# Patient Record
Sex: FEMALE | Race: BLACK OR AFRICAN AMERICAN | NOT HISPANIC OR LATINO | Employment: FULL TIME | URBAN - METROPOLITAN AREA
[De-identification: names, ages, dates, MRNs, and addresses within clinical notes are randomized per-mention and may not be internally consistent; named-entity substitution may affect disease eponyms.]

---

## 2023-06-22 LAB
GRAM STAIN: NORMAL
TISSUE/WOUND CULTURE/SMEAR: NORMAL

## 2023-09-15 PROBLEM — D57.20: Status: ACTIVE | Noted: 2023-09-15

## 2023-09-15 PROBLEM — L08.9 SKIN INFECTION: Status: ACTIVE | Noted: 2023-09-15

## 2023-09-15 PROBLEM — N63.10 BREAST MASS, RIGHT: Status: ACTIVE | Noted: 2023-09-15

## 2023-09-15 PROBLEM — R60.0 EDEMA, LOWER EXTREMITY: Status: ACTIVE | Noted: 2023-09-15

## 2023-09-15 PROBLEM — D64.9 ANEMIA: Status: ACTIVE | Noted: 2023-09-15

## 2023-09-15 PROBLEM — Z87.39 HISTORY OF RIGHT FOOT DROP: Status: ACTIVE | Noted: 2023-09-15

## 2023-09-15 PROBLEM — H52.13 BILATERAL MYOPIA: Status: ACTIVE | Noted: 2023-09-15

## 2023-09-15 PROBLEM — G56.20 ULNAR NERVE COMPRESSION: Status: ACTIVE | Noted: 2023-09-15

## 2023-09-15 PROBLEM — Z79.631 METHOTREXATE, LONG TERM, CURRENT USE: Status: ACTIVE | Noted: 2023-09-15

## 2023-09-15 PROBLEM — N64.89 BREAST DEFORMITY: Status: ACTIVE | Noted: 2023-09-15

## 2023-09-15 PROBLEM — N89.8 VAGINAL DISCHARGE: Status: ACTIVE | Noted: 2023-09-15

## 2023-09-15 PROBLEM — M72.2 PLANTAR FASCIITIS: Status: ACTIVE | Noted: 2023-09-15

## 2023-09-15 PROBLEM — M19.079 ARTHRITIS OF ANKLE: Status: ACTIVE | Noted: 2023-09-15

## 2023-09-15 PROBLEM — M54.31 SCIATICA OF RIGHT SIDE: Status: ACTIVE | Noted: 2023-09-15

## 2023-09-15 PROBLEM — L70.9 ACNE: Status: ACTIVE | Noted: 2023-09-15

## 2023-09-15 PROBLEM — B19.20 UNSPECIFIED VIRAL HEPATITIS C WITHOUT HEPATIC COMA: Status: ACTIVE | Noted: 2023-09-15

## 2023-09-15 PROBLEM — H52.10 MYOPIA WITH PRESBYOPIA: Status: ACTIVE | Noted: 2023-09-15

## 2023-09-15 PROBLEM — L03.90 CELLULITIS: Status: ACTIVE | Noted: 2023-09-15

## 2023-09-15 PROBLEM — T78.40XA ALLERGIC REACTION: Status: ACTIVE | Noted: 2023-09-15

## 2023-09-15 PROBLEM — R76.8 HEPATITIS C ANTIBODY TEST POSITIVE: Status: ACTIVE | Noted: 2023-09-15

## 2023-09-15 PROBLEM — T14.8XXD DELAYED WOUND HEALING: Status: ACTIVE | Noted: 2023-09-15

## 2023-09-15 PROBLEM — M06.9 RHEUMATOID ARTHRITIS (MULTI): Status: ACTIVE | Noted: 2023-09-15

## 2023-09-15 PROBLEM — J45.901 ASTHMA EXACERBATION (HHS-HCC): Status: ACTIVE | Noted: 2023-09-15

## 2023-09-15 PROBLEM — K21.9 CHRONIC GERD: Status: ACTIVE | Noted: 2023-09-15

## 2023-09-15 PROBLEM — M13.0 POLYARTHRITIS: Status: ACTIVE | Noted: 2023-09-15

## 2023-09-15 PROBLEM — N64.4 BREAST PAIN: Status: ACTIVE | Noted: 2023-09-15

## 2023-09-15 PROBLEM — D21.9 FIBROIDS: Status: ACTIVE | Noted: 2023-09-15

## 2023-09-15 PROBLEM — R10.9 ABDOMINAL WALL PAIN IN LEFT FLANK: Status: ACTIVE | Noted: 2023-09-15

## 2023-09-15 PROBLEM — E55.9 VITAMIN D DEFICIENCY: Status: ACTIVE | Noted: 2023-09-15

## 2023-09-15 PROBLEM — E66.01 MORBID OBESITY WITH BMI OF 50.0-59.9, ADULT (MULTI): Status: ACTIVE | Noted: 2023-09-15

## 2023-09-15 PROBLEM — H53.8 BLURRY VISION, BILATERAL: Status: ACTIVE | Noted: 2023-09-15

## 2023-09-15 PROBLEM — H52.4 MYOPIA WITH PRESBYOPIA: Status: ACTIVE | Noted: 2023-09-15

## 2023-09-15 RX ORDER — ALBUTEROL SULFATE 90 UG/1
2 AEROSOL, METERED RESPIRATORY (INHALATION) EVERY 4 HOURS PRN
COMMUNITY
Start: 2015-11-07 | End: 2023-10-17 | Stop reason: WASHOUT

## 2023-09-15 RX ORDER — FOLIC ACID 1 MG/1
1 TABLET ORAL DAILY
COMMUNITY
Start: 2023-02-27 | End: 2023-10-17 | Stop reason: WASHOUT

## 2023-09-15 RX ORDER — EPINEPHRINE 0.15 MG/.15ML
0.15 INJECTION SUBCUTANEOUS AS NEEDED
COMMUNITY
Start: 2023-02-28 | End: 2023-10-17 | Stop reason: WASHOUT

## 2023-09-15 RX ORDER — ONDANSETRON 4 MG/1
1 TABLET, FILM COATED ORAL EVERY 8 HOURS PRN
COMMUNITY
Start: 2023-06-20 | End: 2023-10-17 | Stop reason: WASHOUT

## 2023-09-15 RX ORDER — METHOTREXATE 2.5 MG/1
8 TABLET ORAL
COMMUNITY
Start: 2022-10-31 | End: 2023-10-17 | Stop reason: SDUPTHER

## 2023-09-15 RX ORDER — AMOXICILLIN 500 MG/1
500 CAPSULE ORAL EVERY 8 HOURS
COMMUNITY
Start: 2023-03-23 | End: 2023-10-17 | Stop reason: WASHOUT

## 2023-09-15 RX ORDER — FUROSEMIDE 40 MG/1
40 TABLET ORAL DAILY PRN
COMMUNITY
Start: 2022-08-19 | End: 2023-10-17 | Stop reason: WASHOUT

## 2023-09-15 RX ORDER — PANTOPRAZOLE SODIUM 40 MG/1
1 TABLET, DELAYED RELEASE ORAL DAILY
COMMUNITY
Start: 2022-08-19 | End: 2023-10-17 | Stop reason: WASHOUT

## 2023-09-15 RX ORDER — CYCLOBENZAPRINE HCL 10 MG
10 TABLET ORAL NIGHTLY
COMMUNITY
Start: 2016-10-21 | End: 2023-10-17 | Stop reason: WASHOUT

## 2023-09-15 RX ORDER — UPADACITINIB 15 MG/1
1 TABLET, EXTENDED RELEASE ORAL DAILY
COMMUNITY
Start: 2022-08-28 | End: 2023-10-17 | Stop reason: WASHOUT

## 2023-09-15 RX ORDER — TRIAMCINOLONE ACETONIDE 1 MG/G
1 OINTMENT TOPICAL 2 TIMES DAILY
COMMUNITY
Start: 2023-02-27 | End: 2023-10-17 | Stop reason: WASHOUT

## 2023-09-15 RX ORDER — IBUPROFEN 800 MG/1
800 TABLET ORAL EVERY 8 HOURS PRN
COMMUNITY
End: 2023-10-17 | Stop reason: WASHOUT

## 2023-09-15 RX ORDER — DEXAMETHASONE 4 MG/1
4 TABLET ORAL
COMMUNITY
Start: 2022-08-05 | End: 2023-10-17 | Stop reason: WASHOUT

## 2023-09-15 RX ORDER — METRONIDAZOLE 7.5 MG/G
1 GEL VAGINAL NIGHTLY
COMMUNITY
Start: 2016-02-12 | End: 2023-10-17 | Stop reason: WASHOUT

## 2023-09-15 RX ORDER — SULFAMETHOXAZOLE AND TRIMETHOPRIM 800; 160 MG/1; MG/1
1 TABLET ORAL
COMMUNITY
Start: 2023-06-20 | End: 2023-10-17 | Stop reason: WASHOUT

## 2023-09-15 RX ORDER — CALCIUM CITRATE/VITAMIN D3 200MG-6.25
TABLET ORAL
COMMUNITY
Start: 2020-01-10 | End: 2023-10-17 | Stop reason: WASHOUT

## 2023-10-02 ENCOUNTER — LAB (OUTPATIENT)
Dept: LAB | Facility: LAB | Age: 48
End: 2023-10-02
Payer: COMMERCIAL

## 2023-10-02 DIAGNOSIS — M06.9 RHEUMATOID ARTHRITIS, UNSPECIFIED (MULTI): Primary | ICD-10-CM

## 2023-10-02 DIAGNOSIS — M06.9 RHEUMATOID ARTHRITIS, UNSPECIFIED (MULTI): ICD-10-CM

## 2023-10-02 LAB
BASOPHILS # BLD AUTO: 0.01 X10*3/UL (ref 0–0.1)
BASOPHILS NFR BLD AUTO: 0.1 %
EOSINOPHIL # BLD AUTO: 0.33 X10*3/UL (ref 0–0.7)
EOSINOPHIL NFR BLD AUTO: 4.3 %
ERYTHROCYTE [DISTWIDTH] IN BLOOD BY AUTOMATED COUNT: 23.4 % (ref 11.5–14.5)
HCT VFR BLD AUTO: 29.8 % (ref 36–46)
HGB BLD-MCNC: 9.8 G/DL (ref 12–16)
IMM GRANULOCYTES # BLD AUTO: 0.02 X10*3/UL (ref 0–0.7)
IMM GRANULOCYTES NFR BLD AUTO: 0.3 % (ref 0–0.9)
LYMPHOCYTES # BLD AUTO: 1.32 X10*3/UL (ref 1.2–4.8)
LYMPHOCYTES NFR BLD AUTO: 17.2 %
MCH RBC QN AUTO: 23.8 PG (ref 26–34)
MCHC RBC AUTO-ENTMCNC: 32.9 G/DL (ref 32–36)
MCV RBC AUTO: 72 FL (ref 80–100)
MONOCYTES # BLD AUTO: 0.57 X10*3/UL (ref 0.1–1)
MONOCYTES NFR BLD AUTO: 7.4 %
NEUTROPHILS # BLD AUTO: 5.43 X10*3/UL (ref 1.2–7.7)
NEUTROPHILS NFR BLD AUTO: 70.7 %
NRBC BLD-RTO: 0 /100 WBCS (ref 0–0)
PLATELET # BLD AUTO: 231 X10*3/UL (ref 150–450)
PMV BLD AUTO: 8.6 FL (ref 7.5–11.5)
RBC # BLD AUTO: 4.12 X10*6/UL (ref 4–5.2)
WBC # BLD AUTO: 7.7 X10*3/UL (ref 4.4–11.3)

## 2023-10-02 PROCEDURE — 36415 COLL VENOUS BLD VENIPUNCTURE: CPT

## 2023-10-03 LAB
ALBUMIN SERPL BCP-MCNC: 4 G/DL (ref 3.4–5)
ALP SERPL-CCNC: 86 U/L (ref 33–110)
ALT SERPL W P-5'-P-CCNC: 12 U/L (ref 7–45)
ANION GAP SERPL CALC-SCNC: 13 MMOL/L (ref 10–20)
AST SERPL W P-5'-P-CCNC: 15 U/L (ref 9–39)
BILIRUB SERPL-MCNC: 0.8 MG/DL (ref 0–1.2)
BUN SERPL-MCNC: 10 MG/DL (ref 6–23)
CALCIUM SERPL-MCNC: 8.8 MG/DL (ref 8.6–10.6)
CHLORIDE SERPL-SCNC: 107 MMOL/L (ref 98–107)
CO2 SERPL-SCNC: 26 MMOL/L (ref 21–32)
CREAT SERPL-MCNC: 1 MG/DL (ref 0.5–1.05)
CRP SERPL-MCNC: 2.73 MG/DL
GFR SERPL CREATININE-BSD FRML MDRD: 69 ML/MIN/1.73M*2
GLUCOSE SERPL-MCNC: 63 MG/DL (ref 74–99)
POTASSIUM SERPL-SCNC: 4.5 MMOL/L (ref 3.5–5.3)
PROT SERPL-MCNC: 6.8 G/DL (ref 6.4–8.2)
SODIUM SERPL-SCNC: 141 MMOL/L (ref 136–145)

## 2023-10-17 ENCOUNTER — OFFICE VISIT (OUTPATIENT)
Dept: RHEUMATOLOGY | Facility: CLINIC | Age: 48
End: 2023-10-17
Payer: COMMERCIAL

## 2023-10-17 VITALS
SYSTOLIC BLOOD PRESSURE: 127 MMHG | DIASTOLIC BLOOD PRESSURE: 74 MMHG | BODY MASS INDEX: 48.82 KG/M2 | WEIGHT: 293 LBS | TEMPERATURE: 97.2 F | HEART RATE: 99 BPM | HEIGHT: 65 IN

## 2023-10-17 DIAGNOSIS — M06.9 RHEUMATOID ARTHRITIS INVOLVING MULTIPLE SITES, UNSPECIFIED WHETHER RHEUMATOID FACTOR PRESENT (MULTI): Primary | ICD-10-CM

## 2023-10-17 DIAGNOSIS — J45.901 MILD ASTHMA WITH EXACERBATION, UNSPECIFIED WHETHER PERSISTENT (HHS-HCC): ICD-10-CM

## 2023-10-17 PROCEDURE — 99213 OFFICE O/P EST LOW 20 MIN: CPT | Performed by: INTERNAL MEDICINE

## 2023-10-17 RX ORDER — FUROSEMIDE 40 MG/1
40 TABLET ORAL DAILY PRN
Qty: 90 TABLET | Refills: 3 | Status: SHIPPED | OUTPATIENT
Start: 2023-10-17

## 2023-10-17 RX ORDER — METHYLPREDNISOLONE 4 MG/1
4 TABLET ORAL DAILY
Qty: 7 TABLET | Refills: 0 | Status: SHIPPED | OUTPATIENT
Start: 2023-10-17 | End: 2023-10-24

## 2023-10-17 RX ORDER — ALBUTEROL SULFATE 5 MG/ML
2.5 SOLUTION RESPIRATORY (INHALATION) EVERY 6 HOURS PRN
Qty: 20 ML | Refills: 10 | Status: SHIPPED | OUTPATIENT
Start: 2023-10-17 | End: 2024-04-26 | Stop reason: SDUPTHER

## 2023-10-17 RX ORDER — CLINDAMYCIN PHOSPHATE 11.9 MG/ML
SOLUTION TOPICAL 2 TIMES DAILY
COMMUNITY
Start: 2017-10-05

## 2023-10-17 RX ORDER — DEXTROMETHORPHAN HYDROBROMIDE, GUAIFENESIN 5; 100 MG/5ML; MG/5ML
650 LIQUID ORAL EVERY 8 HOURS PRN
COMMUNITY

## 2023-10-17 RX ORDER — CYCLOBENZAPRINE HCL 10 MG
10 TABLET ORAL NIGHTLY
Qty: 180 TABLET | Refills: 1 | Status: SHIPPED | OUTPATIENT
Start: 2023-10-17 | End: 2024-04-26 | Stop reason: SDUPTHER

## 2023-10-17 RX ORDER — ALBUTEROL SULFATE 5 MG/ML
2.5 SOLUTION RESPIRATORY (INHALATION) EVERY 6 HOURS PRN
COMMUNITY
End: 2023-10-17 | Stop reason: SDUPTHER

## 2023-10-17 RX ORDER — METHOTREXATE 2.5 MG/1
20 TABLET ORAL
Qty: 72 TABLET | Refills: 2 | Status: SHIPPED | OUTPATIENT
Start: 2023-10-17 | End: 2024-10-16

## 2023-10-17 ASSESSMENT — PAIN SCALES - GENERAL: PAINLEVEL: 4

## 2023-10-17 NOTE — PROGRESS NOTES
Chief Complaint   Patient presents with    Rheumatoid Arthritis   She complains of pain involving multiple joints for the past 2 weeks.  The pain particular involves the ulnar aspect of the left wrist and left hand, lower back, knees, feet.  She also notes pain in the lower back to be different than her usual sciatica pain.  She has some numbness sensation in the fourth and fifth digits of the right foot.  She notes stiffness in the mornings in particular after prolonged sitting.  She sits for several hours at work.  She was unable to tolerate Rinvoq due to nausea vomiting and diarrhea.    She is alert, overweight, the lungs are clear to auscultation.  The heart is regular in rhythm.  The abdomen is protuberant but benign.  Extremities without edema.  The musculoskeletal examination shows tenderness with slight soft tissue thickening in the ulnar aspect of the left wrist.  There is mild tenderness on the extensor aspect of the elbows and on the shoulders.  There is tenderness of the right knee.  There is tenderness at the MTP joint of the digits of both feet, left more than right.  The straight leg raise test is normal bilaterally in the seated position.  She has lower back pain when lying in the supine position.      Lab Results   Component Value Date    WBC 7.7 10/02/2023    HGB 9.8 (L) 10/02/2023    HCT 29.8 (L) 10/02/2023    MCV 72 (L) 10/02/2023     10/02/2023     Lab Results   Component Value Date    GLUCOSE 63 (L) 10/02/2023    CALCIUM 8.8 10/02/2023     10/02/2023    K 4.5 10/02/2023    CO2 26 10/02/2023     10/02/2023    BUN 10 10/02/2023    CREATININE 1.00 10/02/2023     Lab Results   Component Value Date    SEDRATE 21 (H) 01/14/2020     Lab Results   Component Value Date    CRP 2.73 (H) 10/02/2023          She has rheumatoid arthritis, eczema, and asthma, positive hepatitis C antibody with negative hepatitis C PCR.  She has had an exacerbation of rheumatoid arthritis involving multiple  joints.  She has symptoms suggestive of right lumbar radiculopathy.  She has a microcytic anemia    She is to do stretching exercises of the lower back.  She is to take methylprednisolone 4 mg daily for 5 to 7 days.  She is to have laboratory for follow-up of methotrexate therapy.   She is to return at the next available office appointment.

## 2023-11-08 ENCOUNTER — APPOINTMENT (OUTPATIENT)
Dept: SURGICAL ONCOLOGY | Facility: CLINIC | Age: 48
End: 2023-11-08
Payer: COMMERCIAL

## 2023-12-15 NOTE — PROGRESS NOTES
BREAST SURGERY NEW PATIENT VISIT    Subjective   Maryann Stockton is a 48 y.o. female referred by Katie Drake CNP for current breast infections following a breast reduction.  In 2016 she had a breast reduction by Dr. Won Terrell.  Immediately following her surgery she states that her wound opened up in the lower part of the right breast.  She saw him back the day following surgery and the area was closed by secondary intention.  She states that postoperatively she had a lot of firmness and hardness in both breasts.  She massaged them daily.  On the left side most of the hardness resolved however, on the right side she was still left with a lot of hardness in the lower outer part of the breast.  In 2022 she began having intermittent infections in her right breast near the areola. 9/15/2022 right ultrasound core biopsy noted fat necrosis.  She feels like there is a now a hard lump near her areola.  There is a pinpoint opening in the 6 o'clock position just outside of the areola which has intermittent drainage.  Overall the lump is getting smaller and she has not had any drainage recently.  But this has been very taking a very long time to improve.  She has intermittently been on Bactrim and it improved her symptoms.  Her last antibiotics were in October 2023.    She is interested in having a revision of her reduction because she still has a lot of redundant tissue in the lateral right breast.  She is also asking to be on antibiotics again in order to prevent another infection.    She is here today with her mother.    Mammogram: July 2023 showed several abscesses in the right breast.  No evidence of malignancy.  Category 2.    Radiology review: All images and reports were personally reviewed.       Past Medical History: Rheumatoid arthritis, sciatica, acid reflux    Non-smoker    GYN History:  Menarche: 10    Menopause: n/a   HRT: n/a  Birth control use: no  Fertility treatment: no  Hysterectomy: no    G 3 P  0  Previous biopsies: Yes -September 2022-fat necrosis, breast  Previous breast surgeries: Yes -breast reduction 2016  Prior breast cancer: No    Family history: none      Review of Systems   Constitutional symptoms: Denies generalized fatigue.  Denies weight change, fevers/chills, difficulty sleeping   Eyes: Denies double vision, glaucoma, cataracts.  Ear/nose/throat/mouth: Denies hearing changes, sore throat, sinus problems.  Cardiovascular: No chest pain.  Denies irregular heartbeat.  Denies ankle swelling.  Respiratory: No wheezing, cough, or shortness of breath.  Gastrointestinal: No abdominal pain,  No nausea/vomiting.  No indigestion/heartburn.  No change in bowel habits.  No constipation or diarrhea.   Genitourinary: No urinary incontinence.  No urinary frequency.  No painful urination.  Musculoskeletal: No bone pain, no muscle pain, no joint pain.   Integumentary: No rash. No masses.  No changes in moles.  No easy bruising.  Neurological: No headaches.  No tremors. No numbness/tingling.  Psychiatric: No anxiety. No depression.  Endocrine: No excessive thirst.  Not too hot or too cold.  Not tired or fatigued.    Hematological/lymphatic: No swollen glands or blood clotting problems.  No bruising.    PHYSICAL EXAM:    General: Alert and oriented x 3.  Mood and affect are appropriate.  HEENT: EOMI, PERRLA.   Neck: supple, no masses, no cervical adenopathy.  Cardiovascular: no lower extremity edema.  Regular rhythm.  Pulmonary: breathing non labored on room air.  Clear to auscultation.  GI: Abdomen soft, no masses. No hepatomegaly or splenomegaly.  Lymph nodes: No supraclavicular or axillary adenopathy bilaterally.  Musculoskeletal: Full range of motion in the upper extremities bilaterally.  Neuro: denies dizziness, tremors  Physical Exam  Chest:          Comments: No cervical, supraclavicular or axillary lymphadenopathy.  Bilateral well-healed Wise pattern incisions.  On the left side there are no palpable  masses, no skin changes and no nipple discharge.  On the right side there is no nipple discharge.  There is a pinpoint scabbed opening in the 6 o'clock position at the edge of the areola without any drainage.  There is firmness in the entire lateral and inferior aspect of the right breast.  There is some firmness in the 3 to 4 o'clock position at the edge of the areola.        Assessment/Plan     48-year-old female who had a breast reduction in 2016.    Following the breast reduction she had wound dehiscence on the right side and has had continued problems with scar tissue and induration.  Currently, there is a lot of induration in the lateral aspect of the right breast and she has more redundant tissue on the right side than the left.  She is interested in a repeat reduction for symmetry.  I have referred her to plastic surgery for that.  She also has had intermittent infections in the periareolar area of the right breast.  She has some firmness just under the areola in the medial inferior aspect.  There is a scabbed pinpoint opening in the 6 o'clock position.  Currently no evidence of infection or drainage.  She does not need to be on antibiotics at this time as there is no acute infection.  I would not recommend surgery at this time since the infection appears to be under control and the drainage and pain are improving.      Megan Wolf MD

## 2023-12-22 ENCOUNTER — OFFICE VISIT (OUTPATIENT)
Dept: SURGICAL ONCOLOGY | Facility: CLINIC | Age: 48
End: 2023-12-22
Payer: COMMERCIAL

## 2023-12-22 VITALS
HEART RATE: 112 BPM | WEIGHT: 293 LBS | TEMPERATURE: 98.2 F | DIASTOLIC BLOOD PRESSURE: 88 MMHG | BODY MASS INDEX: 49.26 KG/M2 | SYSTOLIC BLOOD PRESSURE: 153 MMHG

## 2023-12-22 DIAGNOSIS — N64.4 BREAST PAIN: ICD-10-CM

## 2023-12-22 PROCEDURE — 99203 OFFICE O/P NEW LOW 30 MIN: CPT | Performed by: SURGERY

## 2023-12-22 PROCEDURE — 99213 OFFICE O/P EST LOW 20 MIN: CPT | Performed by: SURGERY

## 2023-12-22 RX ORDER — SULFAMETHOXAZOLE AND TRIMETHOPRIM 800; 160 MG/1; MG/1
TABLET ORAL EVERY 12 HOURS
COMMUNITY
Start: 2023-06-20

## 2023-12-22 RX ORDER — CALCIUM CITRATE/VITAMIN D3 200MG-6.25
TABLET ORAL
COMMUNITY
Start: 2020-01-10

## 2023-12-22 RX ORDER — ONDANSETRON 4 MG/1
TABLET, FILM COATED ORAL EVERY 8 HOURS
COMMUNITY
Start: 2023-06-20

## 2023-12-22 RX ORDER — EPINEPHRINE 0.15 MG/.15ML
INJECTION SUBCUTANEOUS
COMMUNITY
Start: 2023-02-27

## 2023-12-22 RX ORDER — DEXAMETHASONE 4 MG/1
TABLET ORAL
COMMUNITY
Start: 2022-08-05

## 2023-12-22 RX ORDER — IBUPROFEN 800 MG/1
TABLET ORAL
COMMUNITY

## 2023-12-22 RX ORDER — DICLOFENAC SODIUM 16.05 MG/ML
SOLUTION TOPICAL
COMMUNITY
Start: 2020-07-10

## 2023-12-22 RX ORDER — PANTOPRAZOLE SODIUM 40 MG/1
TABLET, DELAYED RELEASE ORAL
COMMUNITY
Start: 2022-08-19

## 2023-12-22 RX ORDER — AMOXICILLIN 500 MG/1
CAPSULE ORAL EVERY 8 HOURS
COMMUNITY
Start: 2023-03-23

## 2023-12-22 RX ORDER — FOLIC ACID 1 MG/1
TABLET ORAL
COMMUNITY
Start: 2023-02-27

## 2023-12-22 RX ORDER — UPADACITINIB 15 MG/1
TABLET, EXTENDED RELEASE ORAL
COMMUNITY
Start: 2020-01-10 | End: 2024-04-26 | Stop reason: WASHOUT

## 2023-12-22 RX ORDER — CETIRIZINE HYDROCHLORIDE 10 MG/1
TABLET ORAL
COMMUNITY
Start: 2022-10-21

## 2023-12-22 ASSESSMENT — PAIN SCALES - GENERAL: PAINLEVEL: 0-NO PAIN

## 2024-04-11 ENCOUNTER — LAB (OUTPATIENT)
Dept: LAB | Facility: LAB | Age: 49
End: 2024-04-11
Payer: COMMERCIAL

## 2024-04-11 ENCOUNTER — TELEPHONE (OUTPATIENT)
Dept: RHEUMATOLOGY | Facility: CLINIC | Age: 49
End: 2024-04-11

## 2024-04-11 DIAGNOSIS — M06.9 RHEUMATOID ARTHRITIS INVOLVING MULTIPLE SITES, UNSPECIFIED WHETHER RHEUMATOID FACTOR PRESENT (MULTI): ICD-10-CM

## 2024-04-11 LAB
BASOPHILS # BLD MANUAL: 0.13 X10*3/UL (ref 0–0.1)
BASOPHILS NFR BLD MANUAL: 1.8 %
CRP SERPL-MCNC: 2.27 MG/DL
DACRYOCYTES BLD QL SMEAR: ABNORMAL
EOSINOPHIL # BLD MANUAL: 0.19 X10*3/UL (ref 0–0.7)
EOSINOPHIL NFR BLD MANUAL: 2.6 %
ERYTHROCYTE [DISTWIDTH] IN BLOOD BY AUTOMATED COUNT: 23 % (ref 11.5–14.5)
HCT VFR BLD AUTO: 29.6 % (ref 36–46)
HGB BLD-MCNC: 9.7 G/DL (ref 12–16)
HYPOCHROMIA BLD QL SMEAR: ABNORMAL
IMM GRANULOCYTES # BLD AUTO: 0.02 X10*3/UL (ref 0–0.7)
IMM GRANULOCYTES NFR BLD AUTO: 0.3 % (ref 0–0.9)
LYMPHOCYTES # BLD MANUAL: 1.09 X10*3/UL (ref 1.2–4.8)
LYMPHOCYTES NFR BLD MANUAL: 14.9 %
MCH RBC QN AUTO: 23.3 PG (ref 26–34)
MCHC RBC AUTO-ENTMCNC: 32.8 G/DL (ref 32–36)
MCV RBC AUTO: 71 FL (ref 80–100)
MONOCYTES # BLD MANUAL: 0.26 X10*3/UL (ref 0.1–1)
MONOCYTES NFR BLD MANUAL: 3.5 %
NEUTS SEG # BLD MANUAL: 5.5 X10*3/UL (ref 1.2–7)
NEUTS SEG NFR BLD MANUAL: 75.4 %
NRBC BLD-RTO: 0.3 /100 WBCS (ref 0–0)
OVALOCYTES BLD QL SMEAR: ABNORMAL
PLATELET # BLD AUTO: 272 X10*3/UL (ref 150–450)
RBC # BLD AUTO: 4.17 X10*6/UL (ref 4–5.2)
RBC MORPH BLD: ABNORMAL
SCHISTOCYTES BLD QL SMEAR: ABNORMAL
STOMATOCYTES BLD QL SMEAR: ABNORMAL
TOTAL CELLS COUNTED BLD: 114
VARIANT LYMPHS # BLD MANUAL: 0.13 X10*3/UL (ref 0–0.5)
VARIANT LYMPHS NFR BLD: 1.8 %
WBC # BLD AUTO: 7.3 X10*3/UL (ref 4.4–11.3)

## 2024-04-11 PROCEDURE — 36415 COLL VENOUS BLD VENIPUNCTURE: CPT

## 2024-04-11 PROCEDURE — 86140 C-REACTIVE PROTEIN: CPT

## 2024-04-11 PROCEDURE — 85027 COMPLETE CBC AUTOMATED: CPT

## 2024-04-11 PROCEDURE — 85007 BL SMEAR W/DIFF WBC COUNT: CPT

## 2024-04-26 ENCOUNTER — OFFICE VISIT (OUTPATIENT)
Dept: RHEUMATOLOGY | Facility: CLINIC | Age: 49
End: 2024-04-26
Payer: COMMERCIAL

## 2024-04-26 VITALS
BODY MASS INDEX: 48.82 KG/M2 | SYSTOLIC BLOOD PRESSURE: 112 MMHG | WEIGHT: 293 LBS | TEMPERATURE: 97.5 F | HEART RATE: 108 BPM | DIASTOLIC BLOOD PRESSURE: 67 MMHG | HEIGHT: 65 IN

## 2024-04-26 DIAGNOSIS — M06.9 RHEUMATOID ARTHRITIS INVOLVING MULTIPLE SITES, UNSPECIFIED WHETHER RHEUMATOID FACTOR PRESENT (MULTI): ICD-10-CM

## 2024-04-26 DIAGNOSIS — M06.9 RHEUMATOID ARTHRITIS INVOLVING MULTIPLE SITES, UNSPECIFIED WHETHER RHEUMATOID FACTOR PRESENT (MULTI): Primary | ICD-10-CM

## 2024-04-26 PROCEDURE — 99214 OFFICE O/P EST MOD 30 MIN: CPT | Performed by: INTERNAL MEDICINE

## 2024-04-26 RX ORDER — ALBUTEROL SULFATE 5 MG/ML
2.5 SOLUTION RESPIRATORY (INHALATION) EVERY 6 HOURS
Qty: 20 ML | Refills: 10 | Status: SHIPPED | OUTPATIENT
Start: 2024-04-26 | End: 2024-05-01

## 2024-04-26 RX ORDER — METHYLPREDNISOLONE 4 MG/1
TABLET ORAL
Qty: 21 TABLET | Refills: 1 | Status: SHIPPED | OUTPATIENT
Start: 2024-04-26 | End: 2024-05-03

## 2024-04-26 RX ORDER — MELOXICAM 7.5 MG/1
15 TABLET ORAL DAILY
Qty: 30 TABLET | Refills: 2 | Status: SHIPPED | OUTPATIENT
Start: 2024-04-26 | End: 2025-04-26

## 2024-04-26 RX ORDER — CYCLOBENZAPRINE HCL 10 MG
10 TABLET ORAL NIGHTLY
Qty: 180 TABLET | Refills: 3 | Status: SHIPPED | OUTPATIENT
Start: 2024-04-26

## 2024-04-26 RX ORDER — ALBUTEROL SULFATE 90 UG/1
2 AEROSOL, METERED RESPIRATORY (INHALATION) EVERY 6 HOURS PRN
Qty: 18 G | Refills: 11 | Status: SHIPPED | OUTPATIENT
Start: 2024-04-26 | End: 2025-04-26

## 2024-04-26 ASSESSMENT — PAIN SCALES - GENERAL: PAINLEVEL: 0-NO PAIN

## 2024-04-26 NOTE — PROGRESS NOTES
She complains of diffuse musculoskeletal pain particular involving her hands and shoulders as well as the posterior base of her neck.  She has pain and discoloration of her feet.  The increased pain and been present for the past 3 weeks.  She has been taking ibuprofen 800 mg twice per day for the past few days.  Tylenol did not relieve the musculoskeletal pain. She had increased the methotrexate from 20 mg to 25 mg for 1 day without any improvement in the musculoskeletal pain. She also notes generalized stiffness and has swelling in her hands.  She notes difficulty lifting the left arm because of the pain in the left shoulder.  She had respiratory tract infection recently with coughing and hoarseness.      The sclera are not injected.  The lungs are clear to auscultation.  The heart has a regular rate and rhythm.  The abdomen is protuberant but benign.  The extremities are without edema.  The musculoskeletal examination shows tenderness in the MCP joint of the digits of both hands, right more than left.  There is tenderness in the right wrist.  There is pain on passive range of motion of the shoulders, left more than right.  There is tenderness and slight crepitus on range of motion of the knees.  There is slight soft tissue thickening of the left ankle.    Laboratory (4/11/2024) WBC 7.3, hemoglobin 9.7, hematocrit 29.6, MCV 71, MCHC 32.8, platelets 272, CRP 2.27.    She has rheumatoid arthritis with polyarticular joint pain.  She has history of eczema, asthma, positive hepatitis C antibody with a negative hepatitis C RNA PCR.  She has history of lower back pain and history of slightly elevated serum creatinine.    She is to take methylprednisolone 4 mg Dosepak.  She may take meloxicam 7.5 mg once per day as needed for pain.  She is to continue methotrexate 20 mg once per week and cyclobenzaprine 10 mg at bedtime as needed for muscle spasms.

## 2024-05-01 ENCOUNTER — TELEPHONE (OUTPATIENT)
Dept: RHEUMATOLOGY | Facility: CLINIC | Age: 49
End: 2024-05-01

## 2024-05-01 DIAGNOSIS — J45.901 EXACERBATION OF ASTHMA, UNSPECIFIED ASTHMA SEVERITY, UNSPECIFIED WHETHER PERSISTENT (HHS-HCC): ICD-10-CM

## 2024-05-01 DIAGNOSIS — M06.9 RHEUMATOID ARTHRITIS INVOLVING MULTIPLE SITES, UNSPECIFIED WHETHER RHEUMATOID FACTOR PRESENT (MULTI): ICD-10-CM

## 2024-05-01 RX ORDER — IPRATROPIUM BROMIDE AND ALBUTEROL SULFATE 2.5; .5 MG/3ML; MG/3ML
3 SOLUTION RESPIRATORY (INHALATION)
Qty: 180 ML | Refills: 11 | Status: SHIPPED | OUTPATIENT
Start: 2024-05-01 | End: 2025-05-01

## 2024-05-01 NOTE — TELEPHONE ENCOUNTER
[facility] at [phone] called and indicated that the Albuterol script needs changed to Albuterol neb vials.

## 2024-07-03 DIAGNOSIS — M06.9 RHEUMATOID ARTHRITIS INVOLVING MULTIPLE SITES, UNSPECIFIED WHETHER RHEUMATOID FACTOR PRESENT (MULTI): ICD-10-CM

## 2024-07-03 RX ORDER — MELOXICAM 7.5 MG/1
15 TABLET ORAL DAILY
Qty: 60 TABLET | Refills: 1 | Status: SHIPPED | OUTPATIENT
Start: 2024-07-03 | End: 2025-07-03

## 2024-08-31 DIAGNOSIS — J45.901 MILD ASTHMA WITH EXACERBATION, UNSPECIFIED WHETHER PERSISTENT (HHS-HCC): ICD-10-CM

## 2024-09-12 ENCOUNTER — TELEPHONE (OUTPATIENT)
Dept: RHEUMATOLOGY | Facility: CLINIC | Age: 49
End: 2024-09-12
Payer: COMMERCIAL

## 2024-09-12 NOTE — TELEPHONE ENCOUNTER
Patient would like a 3 month supply on her methotrexate 2.5mg refill.    [facility]/pharmacy #[id number] - [place] [id number] AT [facility] [address] [place] [address] [address], [facility] [address]  Phone: [phone]  Fax: [phone]  SARITA #: [id number]

## 2024-09-13 DIAGNOSIS — J45.901 MILD ASTHMA WITH EXACERBATION, UNSPECIFIED WHETHER PERSISTENT (HHS-HCC): ICD-10-CM

## 2024-09-13 RX ORDER — METHOTREXATE 2.5 MG/1
20 TABLET ORAL
Qty: 72 TABLET | Refills: 2 | Status: SHIPPED | OUTPATIENT
Start: 2024-09-15 | End: 2025-09-15

## 2024-09-13 RX ORDER — METHOTREXATE 2.5 MG/1
TABLET ORAL
Qty: 72 TABLET | Refills: 2 | OUTPATIENT
Start: 2024-09-13

## 2024-09-13 NOTE — TELEPHONE ENCOUNTER
Call placed to patient to make aware of needed labs and to remind of follow up appointment. Pt states she is completely out and if she can get enough to get to scheduled appointment that would be great. This nurse to discuss with provider. Pt encouraged to keep upcoming appointment and call should anything change.

## 2024-09-16 ENCOUNTER — LAB (OUTPATIENT)
Dept: LAB | Facility: LAB | Age: 49
End: 2024-09-16
Payer: COMMERCIAL

## 2024-09-16 DIAGNOSIS — M06.9 RHEUMATOID ARTHRITIS INVOLVING MULTIPLE SITES, UNSPECIFIED WHETHER RHEUMATOID FACTOR PRESENT (MULTI): ICD-10-CM

## 2024-09-16 LAB
ALBUMIN SERPL BCP-MCNC: 4.1 G/DL (ref 3.4–5)
ALP SERPL-CCNC: 78 U/L (ref 33–110)
ALT SERPL W P-5'-P-CCNC: 9 U/L (ref 7–45)
ANION GAP SERPL CALC-SCNC: 12 MMOL/L (ref 10–20)
AST SERPL W P-5'-P-CCNC: 17 U/L (ref 9–39)
BILIRUB SERPL-MCNC: 1.3 MG/DL (ref 0–1.2)
BUN SERPL-MCNC: 9 MG/DL (ref 6–23)
CALCIUM SERPL-MCNC: 8.6 MG/DL (ref 8.6–10.6)
CHLORIDE SERPL-SCNC: 105 MMOL/L (ref 98–107)
CO2 SERPL-SCNC: 26 MMOL/L (ref 21–32)
CREAT SERPL-MCNC: 1.09 MG/DL (ref 0.5–1.05)
CRP SERPL-MCNC: 2.65 MG/DL
EGFRCR SERPLBLD CKD-EPI 2021: 62 ML/MIN/1.73M*2
GLUCOSE SERPL-MCNC: 80 MG/DL (ref 74–99)
POTASSIUM SERPL-SCNC: 4.2 MMOL/L (ref 3.5–5.3)
PROT SERPL-MCNC: 6.8 G/DL (ref 6.4–8.2)
SODIUM SERPL-SCNC: 139 MMOL/L (ref 136–145)

## 2024-09-16 PROCEDURE — 80053 COMPREHEN METABOLIC PANEL: CPT

## 2024-09-16 PROCEDURE — 85027 COMPLETE CBC AUTOMATED: CPT

## 2024-09-16 PROCEDURE — 36415 COLL VENOUS BLD VENIPUNCTURE: CPT

## 2024-09-16 PROCEDURE — 85007 BL SMEAR W/DIFF WBC COUNT: CPT

## 2024-09-16 PROCEDURE — 86140 C-REACTIVE PROTEIN: CPT

## 2024-09-17 LAB
BASOPHILS # BLD MANUAL: 0 X10*3/UL (ref 0–0.1)
BASOPHILS NFR BLD MANUAL: 0 %
EOSINOPHIL # BLD MANUAL: 0.2 X10*3/UL (ref 0–0.7)
EOSINOPHIL NFR BLD MANUAL: 2.6 %
ERYTHROCYTE [DISTWIDTH] IN BLOOD BY AUTOMATED COUNT: 22.4 % (ref 11.5–14.5)
HCT VFR BLD AUTO: 31.7 % (ref 36–46)
HGB BLD-MCNC: 10.7 G/DL (ref 12–16)
HYPOCHROMIA BLD QL SMEAR: ABNORMAL
IMM GRANULOCYTES # BLD AUTO: 0.04 X10*3/UL (ref 0–0.7)
IMM GRANULOCYTES NFR BLD AUTO: 0.5 % (ref 0–0.9)
LYMPHOCYTES # BLD MANUAL: 1.38 X10*3/UL (ref 1.2–4.8)
LYMPHOCYTES NFR BLD MANUAL: 18.2 %
MCH RBC QN AUTO: 23.6 PG (ref 26–34)
MCHC RBC AUTO-ENTMCNC: 33.8 G/DL (ref 32–36)
MCV RBC AUTO: 70 FL (ref 80–100)
MONOCYTES # BLD MANUAL: 0.27 X10*3/UL (ref 0.1–1)
MONOCYTES NFR BLD MANUAL: 3.5 %
NEUTS SEG # BLD MANUAL: 5.02 X10*3/UL (ref 1.2–7)
NEUTS SEG NFR BLD MANUAL: 66.1 %
NRBC BLD-RTO: 0.3 /100 WBCS (ref 0–0)
PLATELET # BLD AUTO: 270 X10*3/UL (ref 150–450)
RBC # BLD AUTO: 4.54 X10*6/UL (ref 4–5.2)
RBC MORPH BLD: ABNORMAL
SCHISTOCYTES BLD QL SMEAR: ABNORMAL
TOTAL CELLS COUNTED BLD: 115
VARIANT LYMPHS # BLD MANUAL: 0.73 X10*3/UL (ref 0–0.5)
VARIANT LYMPHS NFR BLD: 9.6 %
WBC # BLD AUTO: 7.6 X10*3/UL (ref 4.4–11.3)

## 2024-09-23 ENCOUNTER — OFFICE VISIT (OUTPATIENT)
Dept: RHEUMATOLOGY | Facility: CLINIC | Age: 49
End: 2024-09-23
Payer: COMMERCIAL

## 2024-09-23 VITALS
OXYGEN SATURATION: 96 % | TEMPERATURE: 97.3 F | HEART RATE: 90 BPM | WEIGHT: 293 LBS | SYSTOLIC BLOOD PRESSURE: 101 MMHG | BODY MASS INDEX: 49.12 KG/M2 | DIASTOLIC BLOOD PRESSURE: 68 MMHG

## 2024-09-23 DIAGNOSIS — L70.3 TROPICAL ACNE: Primary | ICD-10-CM

## 2024-09-23 DIAGNOSIS — M06.9 RHEUMATOID ARTHRITIS INVOLVING MULTIPLE SITES, UNSPECIFIED WHETHER RHEUMATOID FACTOR PRESENT (MULTI): ICD-10-CM

## 2024-09-23 PROCEDURE — 99214 OFFICE O/P EST MOD 30 MIN: CPT | Performed by: INTERNAL MEDICINE

## 2024-09-23 PROCEDURE — 1036F TOBACCO NON-USER: CPT | Performed by: INTERNAL MEDICINE

## 2024-09-23 RX ORDER — CLINDAMYCIN PHOSPHATE 11.9 MG/ML
SOLUTION TOPICAL 2 TIMES DAILY
Qty: 60 ML | Refills: 3 | Status: SHIPPED | OUTPATIENT
Start: 2024-09-23 | End: 2025-09-23

## 2024-09-23 RX ORDER — MELOXICAM 7.5 MG/1
15 TABLET ORAL DAILY
Qty: 60 TABLET | Refills: 1 | Status: SHIPPED | OUTPATIENT
Start: 2024-09-23 | End: 2025-09-23

## 2024-09-23 NOTE — PROGRESS NOTES
She presents for follow-up evaluation with complaints of pain in the posterior base of the neck and in her lower back.  She has not had recurrence of sciatica symptoms.  She notes pain on the ulnar aspect of the right wrist.S he has not had any significant joint swelling or pain in her other joints.  She does note pain in the left upper anterior chest and in the lower rib cage anteriorly bilaterally.  She takes meloxicam 7.5 mg once per day as needed for pain.  She notes that acetaminophen 650 mg tablet 1-1/2 tablets once or twice per day does not seem to control the musculoskeletal pain well.  She requests clindamycin topical for management of eczema/facial rash.    The lungs are clear to auscultation.  The heart has a regular and rhythm with normal heart sounds.  The abdomen is benign.  The extremities are without edema.  The musculoskeletal examination shows normal range of motion of the upper and lower extremity joints.  There is slight soft tissue thickening in the ulnar aspect of the right wrist otherwise no joint effusions.  The neck is supple.  There is tenderness over the anterior aspect of the left third rib and tenderness overlying the costochondral junctions of the lower rib cage bilaterally.    Laboratory (9/16/2024) WBC 7.6, hemoglobin 10.7, hematocrit 31.7, MCV 70, MCHC 33.8, platelets 270, BUN 9, creatinine 1.09, glucose 80, calcium 8.6, albumin 4.1, alkaline phosphatase 78, AST 17, ALT 9, CRP 2.65.    She has rheumatoid arthritis appears to be fairly stable with mild synovitis involving the ulnar aspect of the right wrist.  She has symptoms suggestive of costochondritis.  She has lower back pain with history of sciatica likely related to lumbar spondylosis.  She has slightly elevated serum creatinine.  She has history of eczema and asthma and positive hepatitis C antibody with negative hepatitis C PCR RNA.  A slight elevation in serum creatinine may be related to meloxicam and body habitus.    She  is to do stretching exercises to the neck and lower back.  She is to continue meloxicam 7.5 mg once per day as needed for pain, methotrexate 20 mg orally weekly.  She is to eat green leafy vegetables/foods high in folic acid since she has intolerance to folic acid supplements.  She is to have laboratory for follow-up of methotrexate and meloxicam.  She is to return at the next available office appointment.

## 2024-11-12 DIAGNOSIS — J45.901 MILD ASTHMA WITH EXACERBATION, UNSPECIFIED WHETHER PERSISTENT (HHS-HCC): ICD-10-CM

## 2024-11-12 RX ORDER — METHOTREXATE 2.5 MG/1
20 TABLET ORAL
Qty: 72 TABLET | Refills: 2 | Status: SHIPPED | OUTPATIENT
Start: 2024-11-17 | End: 2025-11-17

## 2024-11-13 ENCOUNTER — TELEPHONE (OUTPATIENT)
Dept: RHEUMATOLOGY | Facility: CLINIC | Age: 49
End: 2024-11-13
Payer: COMMERCIAL

## 2024-11-13 DIAGNOSIS — M06.9 RHEUMATOID ARTHRITIS INVOLVING MULTIPLE SITES, UNSPECIFIED WHETHER RHEUMATOID FACTOR PRESENT (MULTI): ICD-10-CM

## 2024-11-13 RX ORDER — IBUPROFEN 800 MG/1
800 TABLET ORAL 3 TIMES DAILY PRN
Qty: 60 TABLET | Refills: 0 | Status: SHIPPED | OUTPATIENT
Start: 2024-11-13 | End: 2025-01-12

## 2024-11-13 NOTE — TELEPHONE ENCOUNTER
Placed call to patient, spoke with patient. Patient stated that she has taken ibuprofen yesterday and today as it is the only medication helping to relieve her pain. Patient notified that her prescription request has been sent to Dr. Machado and if approved, it will be sent to her pharmacy. Patient verbalized understanding.

## 2024-11-13 NOTE — TELEPHONE ENCOUNTER
Patient would like a refill on moltrin 800mg. She said she left message on the refill line. The methotrexate is not working for her. Please advise.    [facility]/pharmacy #[id number] - [place], [place] - [address] [address] AT [facility] Phone: [phone]   Fax: [phone]

## 2024-11-21 ENCOUNTER — APPOINTMENT (OUTPATIENT)
Dept: OBSTETRICS AND GYNECOLOGY | Facility: CLINIC | Age: 49
End: 2024-11-21
Payer: COMMERCIAL

## 2024-11-21 ENCOUNTER — LAB (OUTPATIENT)
Dept: LAB | Facility: LAB | Age: 49
End: 2024-11-21
Payer: COMMERCIAL

## 2024-11-21 VITALS
SYSTOLIC BLOOD PRESSURE: 149 MMHG | DIASTOLIC BLOOD PRESSURE: 89 MMHG | HEIGHT: 66 IN | WEIGHT: 292 LBS | BODY MASS INDEX: 46.93 KG/M2

## 2024-11-21 DIAGNOSIS — N81.11 MIDLINE CYSTOCELE: ICD-10-CM

## 2024-11-21 DIAGNOSIS — Z12.31 ENCOUNTER FOR SCREENING MAMMOGRAM FOR BREAST CANCER: ICD-10-CM

## 2024-11-21 DIAGNOSIS — Z11.3 SCREENING FOR STD (SEXUALLY TRANSMITTED DISEASE): Primary | ICD-10-CM

## 2024-11-21 DIAGNOSIS — Z01.419 WELL WOMAN EXAM WITH ROUTINE GYNECOLOGICAL EXAM: ICD-10-CM

## 2024-11-21 DIAGNOSIS — Z12.31 ENCOUNTER FOR SCREENING MAMMOGRAM FOR MALIGNANT NEOPLASM OF BREAST: ICD-10-CM

## 2024-11-21 DIAGNOSIS — M06.9 RHEUMATOID ARTHRITIS INVOLVING MULTIPLE SITES, UNSPECIFIED WHETHER RHEUMATOID FACTOR PRESENT (MULTI): ICD-10-CM

## 2024-11-21 DIAGNOSIS — Z11.3 SCREENING FOR STD (SEXUALLY TRANSMITTED DISEASE): ICD-10-CM

## 2024-11-21 LAB
BASOPHILS # BLD MANUAL: 0 X10*3/UL (ref 0–0.1)
BASOPHILS NFR BLD MANUAL: 0 %
EOSINOPHIL # BLD MANUAL: 0.25 X10*3/UL (ref 0–0.7)
EOSINOPHIL NFR BLD MANUAL: 2.6 %
ERYTHROCYTE [DISTWIDTH] IN BLOOD BY AUTOMATED COUNT: 21.7 % (ref 11.5–14.5)
HBV SURFACE AG SERPL QL IA: NONREACTIVE
HCT VFR BLD AUTO: 31.5 % (ref 36–46)
HGB BLD-MCNC: 11 G/DL (ref 12–16)
HIV 1+2 AB+HIV1 P24 AG SERPL QL IA: NONREACTIVE
HYPOCHROMIA BLD QL SMEAR: ABNORMAL
IMM GRANULOCYTES # BLD AUTO: 0.02 X10*3/UL (ref 0–0.7)
IMM GRANULOCYTES NFR BLD AUTO: 0.2 % (ref 0–0.9)
LYMPHOCYTES # BLD MANUAL: 1.49 X10*3/UL (ref 1.2–4.8)
LYMPHOCYTES NFR BLD MANUAL: 15.7 %
MCH RBC QN AUTO: 24.1 PG (ref 26–34)
MCHC RBC AUTO-ENTMCNC: 34.9 G/DL (ref 32–36)
MCV RBC AUTO: 69 FL (ref 80–100)
MONOCYTES # BLD MANUAL: 0 X10*3/UL (ref 0.1–1)
MONOCYTES NFR BLD MANUAL: 0 %
NEUTS SEG # BLD MANUAL: 7.18 X10*3/UL (ref 1.2–7)
NEUTS SEG NFR BLD MANUAL: 75.6 %
NRBC BLD-RTO: 0 /100 WBCS (ref 0–0)
PLATELET # BLD AUTO: 264 X10*3/UL (ref 150–450)
PREGNANCY TEST URINE, POC: NEGATIVE
RBC # BLD AUTO: 4.57 X10*6/UL (ref 4–5.2)
RBC MORPH BLD: ABNORMAL
SCHISTOCYTES BLD QL SMEAR: ABNORMAL
TARGETS BLD QL SMEAR: ABNORMAL
TOTAL CELLS COUNTED BLD: 115
VARIANT LYMPHS # BLD MANUAL: 0.58 X10*3/UL (ref 0–0.5)
VARIANT LYMPHS NFR BLD: 6.1 %
WBC # BLD AUTO: 9.5 X10*3/UL (ref 4.4–11.3)

## 2024-11-21 PROCEDURE — 1036F TOBACCO NON-USER: CPT | Performed by: NURSE PRACTITIONER

## 2024-11-21 PROCEDURE — 87205 SMEAR GRAM STAIN: CPT

## 2024-11-21 PROCEDURE — 36415 COLL VENOUS BLD VENIPUNCTURE: CPT

## 2024-11-21 PROCEDURE — 80053 COMPREHEN METABOLIC PANEL: CPT

## 2024-11-21 PROCEDURE — 85007 BL SMEAR W/DIFF WBC COUNT: CPT

## 2024-11-21 PROCEDURE — 99386 PREV VISIT NEW AGE 40-64: CPT | Performed by: NURSE PRACTITIONER

## 2024-11-21 PROCEDURE — 87624 HPV HI-RISK TYP POOLED RSLT: CPT

## 2024-11-21 PROCEDURE — 88175 CYTOPATH C/V AUTO FLUID REDO: CPT

## 2024-11-21 PROCEDURE — 3008F BODY MASS INDEX DOCD: CPT | Performed by: NURSE PRACTITIONER

## 2024-11-21 PROCEDURE — 87591 N.GONORRHOEAE DNA AMP PROB: CPT

## 2024-11-21 PROCEDURE — 86140 C-REACTIVE PROTEIN: CPT

## 2024-11-21 PROCEDURE — 87389 HIV-1 AG W/HIV-1&-2 AB AG IA: CPT

## 2024-11-21 PROCEDURE — 87340 HEPATITIS B SURFACE AG IA: CPT

## 2024-11-21 PROCEDURE — 86780 TREPONEMA PALLIDUM: CPT

## 2024-11-21 PROCEDURE — 87491 CHLMYD TRACH DNA AMP PROBE: CPT

## 2024-11-21 PROCEDURE — 86803 HEPATITIS C AB TEST: CPT

## 2024-11-21 PROCEDURE — 87661 TRICHOMONAS VAGINALIS AMPLIF: CPT

## 2024-11-21 PROCEDURE — 85027 COMPLETE CBC AUTOMATED: CPT

## 2024-11-21 PROCEDURE — 81025 URINE PREGNANCY TEST: CPT | Performed by: NURSE PRACTITIONER

## 2024-11-21 SDOH — ECONOMIC STABILITY: FOOD INSECURITY: WITHIN THE PAST 12 MONTHS, YOU WORRIED THAT YOUR FOOD WOULD RUN OUT BEFORE YOU GOT MONEY TO BUY MORE.: NEVER TRUE

## 2024-11-21 SDOH — ECONOMIC STABILITY: FOOD INSECURITY: WITHIN THE PAST 12 MONTHS, THE FOOD YOU BOUGHT JUST DIDN'T LAST AND YOU DIDN'T HAVE MONEY TO GET MORE.: NEVER TRUE

## 2024-11-21 ASSESSMENT — ENCOUNTER SYMPTOMS
VOMITING: 1
RESPIRATORY NEGATIVE: 1
LOSS OF SENSATION IN FEET: 0
JOINT SWELLING: 1
DEPRESSION: 0
ALLERGIC/IMMUNOLOGIC NEGATIVE: 0
MUSCULOSKELETAL NEGATIVE: 1
ENDOCRINE NEGATIVE: 0
CONSTITUTIONAL NEGATIVE: 0
WHEEZING: 1
CARDIOVASCULAR NEGATIVE: 0
NAUSEA: 1
OCCASIONAL FEELINGS OF UNSTEADINESS: 0
PSYCHIATRIC NEGATIVE: 0
BRUISES/BLEEDS EASILY: 1
NEUROLOGICAL NEGATIVE: 1
GASTROINTESTINAL NEGATIVE: 1
EYES NEGATIVE: 0
HEMATOLOGIC/LYMPHATIC NEGATIVE: 1

## 2024-11-21 ASSESSMENT — PATIENT HEALTH QUESTIONNAIRE - PHQ9
SUM OF ALL RESPONSES TO PHQ9 QUESTIONS 1 & 2: 0
1. LITTLE INTEREST OR PLEASURE IN DOING THINGS: NOT AT ALL
2. FEELING DOWN, DEPRESSED OR HOPELESS: NOT AT ALL

## 2024-11-21 ASSESSMENT — PAIN SCALES - GENERAL: PAINLEVEL_OUTOF10: 0-NO PAIN

## 2024-11-21 NOTE — PROGRESS NOTES
Marry Alamo, APRN-CNP     Subjective   Maryannnakia Stockton is a 49 y.o. female who presents for annual exam.   Patient is a 49-year-old G3, P0 here today for an annual checkup.  Patient reports she has not had an annual exam in a while and had not been sexually active for 7 years but recently had sex with an ex partner.  She is concerned for STDs as he did not use protection.    Patient also states that she has urinary leakage, and feels as though her cervix is at the introitus..  Past Medical History:   Diagnosis Date    Follicular cyst of the skin and subcutaneous tissue, unspecified     Skin cyst    Hypertrophy of breast 11/10/2015    Macromastia    Personal history of diseases of the blood and blood-forming organs and certain disorders involving the immune mechanism 2022    History of anemia    Personal history of other diseases of the musculoskeletal system and connective tissue 2018    History of right foot drop    Personal history of other specified conditions 10/21/2022    History of lump of right breast    Rash and other nonspecific skin eruption 2022    Rash    Unspecified asthma, uncomplicated ([facility]) 2013    Asthma    Unspecified viral hepatitis C without hepatic coma 2022    Hepatitis C test positive     Past Surgical History:   Procedure Laterality Date    INCISION AND DRAINAGE OF WOUND  2015    Incision And Drainage Of Skin Abscess    OTHER SURGICAL HISTORY  2022    Mastopexy    OTHER SURGICAL HISTORY  2022    Breast reduction       OB History          3    Para   0    Term                AB   3    Living             SAB        IAB        Ectopic        Multiple        Live Births   0               Patient's last menstrual period was 10/20/2024 (approximate).      Review of Systems   Respiratory:  Positive for wheezing.    Gastrointestinal:  Positive for nausea and vomiting.   Genitourinary:  Positive for urgency and vaginal discharge.  "  Musculoskeletal:  Positive for joint swelling.   Skin:  Positive for rash.   Hematological:  Bruises/bleeds easily.   All other systems reviewed and are negative.    Breast: Lump   Vaginal: No Complaints        Objective   /89   Ht 1.676 m (5' 6\")   Wt 132 kg (292 lb)   LMP 10/20/2024 (Approximate)   BMI 47.13 kg/m²   Physical Exam  Constitutional:       Appearance: She is obese.   Genitourinary:      Genitourinary Comments: Reduction scars and scar tissue swelling    Mild prolapse noted with bearing down.      Right Labia: No rash or tenderness.     Left Labia: No tenderness or rash.     No vaginal tenderness.      Anterior vaginal prolapse present.     Mild vaginal atrophy present.       Right Adnexa: no mass present.     Left Adnexa: no mass present.     Uterus is prolapsed.   Breasts:     Right: Mass present.      Left: Mass present.   HENT:      Head: Normocephalic.   Cardiovascular:      Rate and Rhythm: Normal rate.      Heart sounds: Normal heart sounds.   Pulmonary:      Effort: Pulmonary effort is normal.   Abdominal:      Palpations: Abdomen is soft.   Musculoskeletal:      Cervical back: Normal range of motion.   Neurological:      Mental Status: She is alert.   Skin:     General: Skin is warm and dry.   Psychiatric:         Mood and Affect: Mood normal.   Vitals reviewed.                   Assessment/Plan   Problem List Items Addressed This Visit    None  Visit Diagnoses         Codes    Screening for STD (sexually transmitted disease)    -  Primary Z11.3    Relevant Orders    Trichomonas vaginalis, Amplified    C. trachomatis / N. gonorrhoeae, Amplified    HIV 1/2 Antigen/Antibody Screen with Reflex to Confirmation    Hepatitis B Surface Antigen    Hepatitis C Antibody    Syphilis Screen with Reflex    POCT pregnancy, urine manually resulted (Completed)    Vaginitis Gram Stain For Bacterial Vaginosis + Yeast    Well woman exam with routine gynecological exam     Z01.419    Relevant Orders "    THINPREP PAP TEST (>30)    Encounter for screening mammogram for breast cancer     Z12.31    Encounter for screening mammogram for malignant neoplasm of breast     Z12.31    Relevant Orders    BI mammo bilateral screening tomosynthesis    Midline cystocele     N81.11    Relevant Orders    Referral to Urogynecology

## 2024-11-22 LAB
ALBUMIN SERPL BCP-MCNC: 4.2 G/DL (ref 3.4–5)
ALP SERPL-CCNC: 82 U/L (ref 33–110)
ALT SERPL W P-5'-P-CCNC: 11 U/L (ref 7–45)
ANION GAP SERPL CALC-SCNC: 12 MMOL/L (ref 10–20)
AST SERPL W P-5'-P-CCNC: 17 U/L (ref 9–39)
BILIRUB SERPL-MCNC: 1.2 MG/DL (ref 0–1.2)
BUN SERPL-MCNC: 7 MG/DL (ref 6–23)
C TRACH RRNA SPEC QL NAA+PROBE: NEGATIVE
C TRACH RRNA SPEC QL NAA+PROBE: NEGATIVE
CALCIUM SERPL-MCNC: 8.7 MG/DL (ref 8.6–10.6)
CHLORIDE SERPL-SCNC: 103 MMOL/L (ref 98–107)
CO2 SERPL-SCNC: 25 MMOL/L (ref 21–32)
CREAT SERPL-MCNC: 0.91 MG/DL (ref 0.5–1.05)
CRP SERPL-MCNC: 1.92 MG/DL
EGFRCR SERPLBLD CKD-EPI 2021: 77 ML/MIN/1.73M*2
GLUCOSE SERPL-MCNC: 91 MG/DL (ref 74–99)
HCV AB SER QL: NONREACTIVE
N GONORRHOEA DNA SPEC QL PROBE+SIG AMP: NEGATIVE
N GONORRHOEA DNA SPEC QL PROBE+SIG AMP: NEGATIVE
POTASSIUM SERPL-SCNC: 4.2 MMOL/L (ref 3.5–5.3)
PROT SERPL-MCNC: 7.3 G/DL (ref 6.4–8.2)
SODIUM SERPL-SCNC: 136 MMOL/L (ref 136–145)
T VAGINALIS RRNA SPEC QL NAA+PROBE: NEGATIVE
T VAGINALIS RRNA SPEC QL NAA+PROBE: NEGATIVE
TREPONEMA PALLIDUM IGG+IGM AB [PRESENCE] IN SERUM OR PLASMA BY IMMUNOASSAY: NONREACTIVE

## 2024-11-23 LAB
BACTERIAL VAGINOSIS VAG-IMP: NORMAL
CLUE CELLS VAG LPF-#/AREA: NORMAL /[LPF]
NUGENT SCORE: 4
YEAST VAG WET PREP-#/AREA: NORMAL

## 2024-12-05 LAB
CYTOLOGY CMNT CVX/VAG CYTO-IMP: NORMAL
HPV HR 12 DNA GENITAL QL NAA+PROBE: NEGATIVE
HPV HR GENOTYPES PNL CVX NAA+PROBE: NEGATIVE
HPV16 DNA SPEC QL NAA+PROBE: NEGATIVE
HPV18 DNA SPEC QL NAA+PROBE: NEGATIVE
LAB AP HPV GENOTYPE QUESTION: YES
LAB AP HPV HR: NORMAL
LAB AP PAP ADDITIONAL TESTS: NORMAL
LABORATORY COMMENT REPORT: NORMAL
LMP START DATE: NORMAL
PATH REPORT.TOTAL CANCER: NORMAL

## 2025-04-08 ENCOUNTER — APPOINTMENT (OUTPATIENT)
Dept: RHEUMATOLOGY | Facility: CLINIC | Age: 50
End: 2025-04-08
Payer: COMMERCIAL

## 2025-04-08 VITALS
WEIGHT: 293 LBS | OXYGEN SATURATION: 98 % | HEIGHT: 66 IN | HEART RATE: 91 BPM | TEMPERATURE: 98 F | DIASTOLIC BLOOD PRESSURE: 84 MMHG | SYSTOLIC BLOOD PRESSURE: 131 MMHG | BODY MASS INDEX: 47.09 KG/M2

## 2025-04-08 DIAGNOSIS — M06.9 RHEUMATOID ARTHRITIS INVOLVING MULTIPLE SITES, UNSPECIFIED WHETHER RHEUMATOID FACTOR PRESENT (MULTI): Primary | ICD-10-CM

## 2025-04-08 DIAGNOSIS — J45.901 MILD ASTHMA WITH EXACERBATION, UNSPECIFIED WHETHER PERSISTENT (HHS-HCC): ICD-10-CM

## 2025-04-08 PROCEDURE — 99214 OFFICE O/P EST MOD 30 MIN: CPT | Performed by: INTERNAL MEDICINE

## 2025-04-08 PROCEDURE — 3008F BODY MASS INDEX DOCD: CPT | Performed by: INTERNAL MEDICINE

## 2025-04-08 PROCEDURE — 1036F TOBACCO NON-USER: CPT | Performed by: INTERNAL MEDICINE

## 2025-04-08 RX ORDER — PREDNISONE 10 MG/1
10 TABLET ORAL DAILY
Qty: 5 TABLET | Refills: 0 | Status: SHIPPED | OUTPATIENT
Start: 2025-04-08 | End: 2025-04-13

## 2025-04-08 RX ORDER — FOLIC ACID 1 MG/1
1 TABLET ORAL DAILY
Qty: 30 TABLET | Refills: 2 | Status: SHIPPED | OUTPATIENT
Start: 2025-04-08 | End: 2025-07-07

## 2025-04-08 RX ORDER — METHOTREXATE 2.5 MG/1
20 TABLET ORAL
Qty: 96 TABLET | Refills: 0 | Status: SHIPPED | OUTPATIENT
Start: 2025-04-13 | End: 2025-07-12

## 2025-04-08 RX ORDER — MELOXICAM 7.5 MG/1
15 TABLET ORAL DAILY
Qty: 180 TABLET | Refills: 0 | Status: SHIPPED | OUTPATIENT
Start: 2025-04-08 | End: 2025-07-07

## 2025-04-08 ASSESSMENT — CLINICAL DISEASE ACTIVITY INDEX (CDAI)
RIGHT_WRIST: BOTH TENDER & SWOLLEN
LEFT_MCP_4: TENDER
RIGHT_KNEE: TENDER
RIGHT_MCP_5: BOTH TENDER & SWOLLEN
PHYSICIAN_ASSESSMENT: 7
LEFT_PIP_4: TENDER
RIGHT_PIP_5: TENDER
LEFT_MCP_3: TENDER
PATIENT_ASSESSMENT: 7
LEFT_WRIST: BOTH TENDER & SWOLLEN
SWOLLEN_JOINTS_COUNT: 7
RIGHT_PIP_1: TENDER
LEFT_MCP_5: TENDER
LEFT_PIP_2: TENDER
RIGHT_PIP_4: TENDER
LEFT_KNEE: TENDER
TOTAL_SCORE: 49
RIGHT_MCP_4: BOTH TENDER & SWOLLEN
TENDER_JOINTS_COUNT: 28
LEFT_PIP_5: TENDER
RIGHT_MCP_1: BOTH TENDER & SWOLLEN
RIGHT_ELBOW: TENDER
RIGHT_MCP_3: BOTH TENDER & SWOLLEN
RIGHT_MCP_2: BOTH TENDER & SWOLLEN
LEFT_MCP_1: TENDER
RIGHT_PIP_3: TENDER
LEFT_PIP_3: TENDER
LEFT_ELBOW: TENDER
RIGHT_PIP_2: TENDER
LEFT_PIP_1: TENDER
LEFT_SHOULDER: TENDER
RIGHT_SHOULDER: TENDER
LEFT_MCP_2: TENDER

## 2025-04-08 ASSESSMENT — PAIN SCALES - GENERAL: PAINLEVEL_OUTOF10: 5

## 2025-04-08 NOTE — PROGRESS NOTES
She presents for follow-up evaluation since last visit in 9/2024. Swelling and pain in her R knee, b/l eblows, b/l wrists, and in her hands. Has been taking Methotrexate 20 mg weekly and using Meloxicam and Motrin as needed. Helps some but feels that her RA flares with the changes in weather. Is hesitant to change medications as she doesn't like injections and briefly tried Rinvoq but reports it caused nausea and vomiting.     The lungs are clear to auscultation.  The heart has a regular and rhythm with normal heart sounds.  The abdomen is benign.  The extremities are without edema.  The musculoskeletal examination shows normal range of motion of the upper and lower extremity joints. Swelling and TTP of both wrists b/l and R 1st-5th MCPs. The neck is supple.      Laboratory (11/21/2024) WBC 9.5, hemoglobin 11, hematocrit 31.5, MCV 69, MCHC 24.1, platelets 264 BUN 7, creatinine .91, glucose 91, calcium 8.7, albumin 4.2, alkaline phosphatase 82, AST 17, ALT 11, CRP 1.92.    She has lower back pain with history of sciatica likely related to lumbar spondylosis.  She has slightly elevated serum creatinine.  She has history of eczema and asthma and positive hepatitis C antibody with negative hepatitis C PCR RNA.      Today, she has rheumatoid arthritis appears active today with CDAI 49. Recommend considering a biologic and counseled on importance of disease control. She would like to take time to consider these options. In the meantime, given short course of Prednisone 10 mg. Should also help with her wheezing and sore throat. Advised not to take NSAIDs while on the Prednisone, states she understands.     She is to eat green leafy vegetables/foods high in folic acid since she has intolerance to folic acid supplements.  She is to have laboratory for follow-up of methotrexate and meloxicam.  She is to return at the next available office appointment.

## 2025-04-10 DIAGNOSIS — M06.9 RHEUMATOID ARTHRITIS INVOLVING MULTIPLE SITES, UNSPECIFIED WHETHER RHEUMATOID FACTOR PRESENT (MULTI): ICD-10-CM

## 2025-04-11 RX ORDER — IBUPROFEN 800 MG/1
800 TABLET ORAL 3 TIMES DAILY PRN
Qty: 60 TABLET | Refills: 0 | Status: SHIPPED | OUTPATIENT
Start: 2025-04-11 | End: 2025-06-10

## 2025-05-19 DIAGNOSIS — M06.9 RHEUMATOID ARTHRITIS INVOLVING MULTIPLE SITES, UNSPECIFIED WHETHER RHEUMATOID FACTOR PRESENT (MULTI): ICD-10-CM

## 2025-05-19 RX ORDER — CYCLOBENZAPRINE HCL 10 MG
10 TABLET ORAL NIGHTLY
Qty: 90 TABLET | Refills: 7 | Status: SHIPPED | OUTPATIENT
Start: 2025-05-19

## 2025-05-30 DIAGNOSIS — M06.9 RHEUMATOID ARTHRITIS INVOLVING MULTIPLE SITES, UNSPECIFIED WHETHER RHEUMATOID FACTOR PRESENT (MULTI): ICD-10-CM

## 2025-07-25 DIAGNOSIS — M06.9 RHEUMATOID ARTHRITIS INVOLVING MULTIPLE SITES, UNSPECIFIED WHETHER RHEUMATOID FACTOR PRESENT (MULTI): ICD-10-CM

## 2025-07-30 DIAGNOSIS — J45.901 MILD ASTHMA WITH EXACERBATION, UNSPECIFIED WHETHER PERSISTENT (HHS-HCC): ICD-10-CM

## 2025-07-30 RX ORDER — METHOTREXATE 2.5 MG/1
20 TABLET ORAL
Qty: 96 TABLET | Refills: 0 | Status: SHIPPED | OUTPATIENT
Start: 2025-07-30

## 2025-08-02 DIAGNOSIS — M06.9 RHEUMATOID ARTHRITIS INVOLVING MULTIPLE SITES, UNSPECIFIED WHETHER RHEUMATOID FACTOR PRESENT (MULTI): ICD-10-CM

## 2025-08-04 RX ORDER — MELOXICAM 7.5 MG/1
15 TABLET ORAL DAILY
Qty: 180 TABLET | Refills: 0 | Status: SHIPPED | OUTPATIENT
Start: 2025-08-04

## 2025-08-06 DIAGNOSIS — Z12.31 ENCOUNTER FOR SCREENING MAMMOGRAM FOR BREAST CANCER: ICD-10-CM

## 2025-08-17 ENCOUNTER — HOSPITAL ENCOUNTER (EMERGENCY)
Facility: HOSPITAL | Age: 50
Discharge: HOME | End: 2025-08-17
Attending: EMERGENCY MEDICINE
Payer: COMMERCIAL

## 2025-08-17 ENCOUNTER — APPOINTMENT (OUTPATIENT)
Dept: RADIOLOGY | Facility: HOSPITAL | Age: 50
End: 2025-08-17
Payer: COMMERCIAL

## 2025-08-17 ENCOUNTER — APPOINTMENT (OUTPATIENT)
Dept: CARDIOLOGY | Facility: HOSPITAL | Age: 50
End: 2025-08-17
Payer: COMMERCIAL

## 2025-08-17 VITALS
DIASTOLIC BLOOD PRESSURE: 60 MMHG | HEIGHT: 66 IN | RESPIRATION RATE: 17 BRPM | HEART RATE: 77 BPM | BODY MASS INDEX: 45 KG/M2 | OXYGEN SATURATION: 99 % | WEIGHT: 280 LBS | SYSTOLIC BLOOD PRESSURE: 136 MMHG | TEMPERATURE: 97 F

## 2025-08-17 DIAGNOSIS — R07.9 CHEST PAIN, UNSPECIFIED TYPE: Primary | ICD-10-CM

## 2025-08-17 LAB
ALBUMIN SERPL BCP-MCNC: 4.1 G/DL (ref 3.4–5)
ALP SERPL-CCNC: 85 U/L (ref 33–110)
ALT SERPL W P-5'-P-CCNC: 10 U/L (ref 7–45)
ANION GAP SERPL CALC-SCNC: 14 MMOL/L (ref 10–20)
APTT PPP: 30 SECONDS (ref 26–36)
AST SERPL W P-5'-P-CCNC: 23 U/L (ref 9–39)
B-HCG SERPL-ACNC: <2 MIU/ML
BASOPHILS # BLD AUTO: 0.08 X10*3/UL (ref 0–0.1)
BASOPHILS NFR BLD AUTO: 0.7 %
BILIRUB SERPL-MCNC: 0.9 MG/DL (ref 0–1.2)
BNP SERPL-MCNC: 14 PG/ML (ref 0–99)
BUN SERPL-MCNC: 8 MG/DL (ref 6–23)
CALCIUM SERPL-MCNC: 8.2 MG/DL (ref 8.6–10.3)
CARDIAC TROPONIN I PNL SERPL HS: 3 NG/L (ref 0–13)
CARDIAC TROPONIN I PNL SERPL HS: 4 NG/L (ref 0–13)
CHLORIDE SERPL-SCNC: 109 MMOL/L (ref 98–107)
CO2 SERPL-SCNC: 19 MMOL/L (ref 21–32)
CREAT SERPL-MCNC: 1.06 MG/DL (ref 0.5–1.05)
EGFRCR SERPLBLD CKD-EPI 2021: 64 ML/MIN/1.73M*2
EOSINOPHIL # BLD AUTO: 0.37 X10*3/UL (ref 0–0.7)
EOSINOPHIL NFR BLD AUTO: 3.2 %
ERYTHROCYTE [DISTWIDTH] IN BLOOD BY AUTOMATED COUNT: 23.8 % (ref 11.5–14.5)
FLUAV RNA RESP QL NAA+PROBE: NOT DETECTED
FLUBV RNA RESP QL NAA+PROBE: NOT DETECTED
GLUCOSE SERPL-MCNC: 93 MG/DL (ref 74–99)
HCT VFR BLD AUTO: 30.8 % (ref 36–46)
HGB BLD-MCNC: 10.5 G/DL (ref 12–16)
IMM GRANULOCYTES # BLD AUTO: 0.11 X10*3/UL (ref 0–0.7)
IMM GRANULOCYTES NFR BLD AUTO: 0.9 % (ref 0–0.9)
INR PPP: 1.2 (ref 0.9–1.1)
LYMPHOCYTES # BLD AUTO: 2.4 X10*3/UL (ref 1.2–4.8)
LYMPHOCYTES NFR BLD AUTO: 20.6 %
MAGNESIUM SERPL-MCNC: 1.91 MG/DL (ref 1.6–2.4)
MCH RBC QN AUTO: 24.2 PG (ref 26–34)
MCHC RBC AUTO-ENTMCNC: 34.1 G/DL (ref 32–36)
MCV RBC AUTO: 71 FL (ref 80–100)
MONOCYTES # BLD AUTO: 0.82 X10*3/UL (ref 0.1–1)
MONOCYTES NFR BLD AUTO: 7 %
NEUTROPHILS # BLD AUTO: 7.89 X10*3/UL (ref 1.2–7.7)
NEUTROPHILS NFR BLD AUTO: 67.6 %
NRBC BLD-RTO: 0.2 /100 WBCS (ref 0–0)
OVALOCYTES BLD QL SMEAR: NORMAL
PLATELET # BLD AUTO: 218 X10*3/UL (ref 150–450)
POLYCHROMASIA BLD QL SMEAR: NORMAL
POTASSIUM SERPL-SCNC: 4.6 MMOL/L (ref 3.5–5.3)
PROT SERPL-MCNC: 6.5 G/DL (ref 6.4–8.2)
PROTHROMBIN TIME: 13.5 SECONDS (ref 9.8–12.4)
RBC # BLD AUTO: 4.33 X10*6/UL (ref 4–5.2)
RBC MORPH BLD: NORMAL
RSV RNA RESP QL NAA+PROBE: NOT DETECTED
SARS-COV-2 RNA RESP QL NAA+PROBE: NOT DETECTED
SODIUM SERPL-SCNC: 137 MMOL/L (ref 136–145)
TARGETS BLD QL SMEAR: NORMAL
WBC # BLD AUTO: 11.7 X10*3/UL (ref 4.4–11.3)

## 2025-08-17 PROCEDURE — 2500000001 HC RX 250 WO HCPCS SELF ADMINISTERED DRUGS (ALT 637 FOR MEDICARE OP): Performed by: EMERGENCY MEDICINE

## 2025-08-17 PROCEDURE — 99285 EMERGENCY DEPT VISIT HI MDM: CPT | Mod: 25 | Performed by: EMERGENCY MEDICINE

## 2025-08-17 PROCEDURE — 84484 ASSAY OF TROPONIN QUANT: CPT | Performed by: EMERGENCY MEDICINE

## 2025-08-17 PROCEDURE — 85610 PROTHROMBIN TIME: CPT | Performed by: EMERGENCY MEDICINE

## 2025-08-17 PROCEDURE — 36415 COLL VENOUS BLD VENIPUNCTURE: CPT | Performed by: EMERGENCY MEDICINE

## 2025-08-17 PROCEDURE — 71045 X-RAY EXAM CHEST 1 VIEW: CPT | Performed by: RADIOLOGY

## 2025-08-17 PROCEDURE — 85730 THROMBOPLASTIN TIME PARTIAL: CPT | Performed by: EMERGENCY MEDICINE

## 2025-08-17 PROCEDURE — 87637 SARSCOV2&INF A&B&RSV AMP PRB: CPT | Performed by: EMERGENCY MEDICINE

## 2025-08-17 PROCEDURE — 93005 ELECTROCARDIOGRAM TRACING: CPT

## 2025-08-17 PROCEDURE — 96374 THER/PROPH/DIAG INJ IV PUSH: CPT

## 2025-08-17 PROCEDURE — 2500000004 HC RX 250 GENERAL PHARMACY W/ HCPCS (ALT 636 FOR OP/ED): Mod: JZ | Performed by: EMERGENCY MEDICINE

## 2025-08-17 PROCEDURE — 71045 X-RAY EXAM CHEST 1 VIEW: CPT

## 2025-08-17 PROCEDURE — 96375 TX/PRO/DX INJ NEW DRUG ADDON: CPT

## 2025-08-17 PROCEDURE — 84702 CHORIONIC GONADOTROPIN TEST: CPT | Performed by: EMERGENCY MEDICINE

## 2025-08-17 PROCEDURE — 83880 ASSAY OF NATRIURETIC PEPTIDE: CPT | Performed by: EMERGENCY MEDICINE

## 2025-08-17 PROCEDURE — 2500000005 HC RX 250 GENERAL PHARMACY W/O HCPCS: Performed by: EMERGENCY MEDICINE

## 2025-08-17 PROCEDURE — 80053 COMPREHEN METABOLIC PANEL: CPT | Performed by: EMERGENCY MEDICINE

## 2025-08-17 PROCEDURE — 2500000002 HC RX 250 W HCPCS SELF ADMINISTERED DRUGS (ALT 637 FOR MEDICARE OP, ALT 636 FOR OP/ED): Performed by: EMERGENCY MEDICINE

## 2025-08-17 PROCEDURE — 83735 ASSAY OF MAGNESIUM: CPT | Performed by: EMERGENCY MEDICINE

## 2025-08-17 PROCEDURE — 85025 COMPLETE CBC W/AUTO DIFF WBC: CPT | Performed by: EMERGENCY MEDICINE

## 2025-08-17 RX ORDER — ALUMINUM HYDROXIDE, MAGNESIUM HYDROXIDE, AND SIMETHICONE 1200; 120; 1200 MG/30ML; MG/30ML; MG/30ML
30 SUSPENSION ORAL ONCE
Status: COMPLETED | OUTPATIENT
Start: 2025-08-17 | End: 2025-08-17

## 2025-08-17 RX ORDER — NAPROXEN SODIUM 220 MG/1
324 TABLET, FILM COATED ORAL ONCE
Status: COMPLETED | OUTPATIENT
Start: 2025-08-17 | End: 2025-08-17

## 2025-08-17 RX ORDER — OMEPRAZOLE 20 MG/1
20 CAPSULE, DELAYED RELEASE ORAL DAILY
Qty: 30 CAPSULE | Refills: 0 | Status: SHIPPED | OUTPATIENT
Start: 2025-08-17 | End: 2025-09-16

## 2025-08-17 RX ORDER — KETOROLAC TROMETHAMINE 30 MG/ML
30 INJECTION, SOLUTION INTRAMUSCULAR; INTRAVENOUS ONCE
Status: COMPLETED | OUTPATIENT
Start: 2025-08-17 | End: 2025-08-17

## 2025-08-17 RX ORDER — LIDOCAINE HYDROCHLORIDE 20 MG/ML
15 SOLUTION OROPHARYNGEAL ONCE
Status: COMPLETED | OUTPATIENT
Start: 2025-08-17 | End: 2025-08-17

## 2025-08-17 RX ORDER — SUCRALFATE 1 G/10ML
1 SUSPENSION ORAL ONCE
Status: COMPLETED | OUTPATIENT
Start: 2025-08-17 | End: 2025-08-17

## 2025-08-17 RX ORDER — SUCRALFATE 1 G/1
1 TABLET ORAL
Qty: 120 TABLET | Refills: 0 | Status: SHIPPED | OUTPATIENT
Start: 2025-08-17 | End: 2026-08-17

## 2025-08-17 RX ORDER — FAMOTIDINE 10 MG/ML
40 INJECTION, SOLUTION INTRAVENOUS ONCE
Status: COMPLETED | OUTPATIENT
Start: 2025-08-17 | End: 2025-08-17

## 2025-08-17 RX ADMIN — FAMOTIDINE 40 MG: 10 INJECTION, SOLUTION INTRAVENOUS at 02:44

## 2025-08-17 RX ADMIN — ASPIRIN 81 MG CHEWABLE TABLET 324 MG: 81 TABLET CHEWABLE at 02:44

## 2025-08-17 RX ADMIN — SUCRALFATE ORAL SUSPENSION 1 G: 1 SUSPENSION ORAL at 04:56

## 2025-08-17 RX ADMIN — ALUMINUM HYDROXIDE, MAGNESIUM HYDROXIDE, AND SIMETHICONE 30 ML: 200; 200; 20 SUSPENSION ORAL at 02:44

## 2025-08-17 RX ADMIN — LIDOCAINE HYDROCHLORIDE 15 ML: 20 SOLUTION ORAL at 02:44

## 2025-08-17 RX ADMIN — KETOROLAC TROMETHAMINE 30 MG: 30 INJECTION, SOLUTION INTRAMUSCULAR at 05:01

## 2025-08-17 ASSESSMENT — PAIN DESCRIPTION - LOCATION
LOCATION: CHEST
LOCATION: CHEST

## 2025-08-17 ASSESSMENT — PAIN DESCRIPTION - PAIN TYPE: TYPE: ACUTE PAIN

## 2025-08-17 ASSESSMENT — PAIN - FUNCTIONAL ASSESSMENT
PAIN_FUNCTIONAL_ASSESSMENT: 0-10
PAIN_FUNCTIONAL_ASSESSMENT: 0-10

## 2025-08-17 ASSESSMENT — PAIN SCALES - GENERAL
PAINLEVEL_OUTOF10: 2
PAINLEVEL_OUTOF10: 10 - WORST POSSIBLE PAIN
PAINLEVEL_OUTOF10: 10 - WORST POSSIBLE PAIN

## 2025-08-19 ENCOUNTER — HOSPITAL ENCOUNTER (OUTPATIENT)
Dept: RADIOLOGY | Facility: CLINIC | Age: 50
Discharge: HOME | End: 2025-08-19
Payer: COMMERCIAL

## 2025-08-19 VITALS — BODY MASS INDEX: 45 KG/M2 | WEIGHT: 279.98 LBS | HEIGHT: 66 IN

## 2025-08-19 DIAGNOSIS — Z12.31 ENCOUNTER FOR SCREENING MAMMOGRAM FOR BREAST CANCER: ICD-10-CM

## 2025-08-19 DIAGNOSIS — M06.9 RHEUMATOID ARTHRITIS INVOLVING MULTIPLE SITES, UNSPECIFIED WHETHER RHEUMATOID FACTOR PRESENT (MULTI): ICD-10-CM

## 2025-08-19 PROCEDURE — 77067 SCR MAMMO BI INCL CAD: CPT | Performed by: STUDENT IN AN ORGANIZED HEALTH CARE EDUCATION/TRAINING PROGRAM

## 2025-08-19 PROCEDURE — 77063 BREAST TOMOSYNTHESIS BI: CPT

## 2025-08-19 PROCEDURE — 77063 BREAST TOMOSYNTHESIS BI: CPT | Performed by: STUDENT IN AN ORGANIZED HEALTH CARE EDUCATION/TRAINING PROGRAM

## 2025-08-20 ENCOUNTER — APPOINTMENT (OUTPATIENT)
Dept: PRIMARY CARE | Facility: CLINIC | Age: 50
End: 2025-08-20
Payer: COMMERCIAL

## 2025-08-20 LAB
ALBUMIN SERPL-MCNC: 4.2 G/DL (ref 3.6–5.1)
ALP SERPL-CCNC: 80 U/L (ref 37–153)
ALT SERPL-CCNC: 10 U/L (ref 6–29)
ANION GAP SERPL CALCULATED.4IONS-SCNC: 5 MMOL/L (CALC) (ref 7–17)
AST SERPL-CCNC: 18 U/L (ref 10–35)
BASOPHILS # BLD AUTO: 23 CELLS/UL (ref 0–200)
BASOPHILS NFR BLD AUTO: 0.4 %
BILIRUB SERPL-MCNC: 1.3 MG/DL (ref 0.2–1.2)
BUN SERPL-MCNC: 11 MG/DL (ref 7–25)
CALCIUM SERPL-MCNC: 8 MG/DL (ref 8.6–10.4)
CHLORIDE SERPL-SCNC: 109 MMOL/L (ref 98–110)
CO2 SERPL-SCNC: 23 MMOL/L (ref 20–32)
CREAT SERPL-MCNC: 0.96 MG/DL (ref 0.5–1.03)
CRP SERPL-MCNC: 35.6 MG/L
EGFRCR SERPLBLD CKD-EPI 2021: 72 ML/MIN/1.73M2
EOSINOPHIL # BLD AUTO: 108 CELLS/UL (ref 15–500)
EOSINOPHIL NFR BLD AUTO: 1.9 %
ERYTHROCYTE [DISTWIDTH] IN BLOOD BY AUTOMATED COUNT: 23.4 % (ref 11–15)
GLUCOSE SERPL-MCNC: 91 MG/DL (ref 65–139)
HCT VFR BLD AUTO: 33.3 % (ref 35–45)
HGB BLD-MCNC: 10.6 G/DL (ref 11.7–15.5)
LYMPHOCYTES # BLD AUTO: 827 CELLS/UL (ref 850–3900)
LYMPHOCYTES NFR BLD AUTO: 14.5 %
MCH RBC QN AUTO: 24.6 PG (ref 27–33)
MCHC RBC AUTO-ENTMCNC: 31.8 G/DL (ref 32–36)
MCV RBC AUTO: 77.3 FL (ref 80–100)
MONOCYTES # BLD AUTO: 547 CELLS/UL (ref 200–950)
MONOCYTES NFR BLD AUTO: 9.6 %
MORPHOLOGY BLD-IMP: ABNORMAL
NEUTROPHILS # BLD AUTO: 4195 CELLS/UL (ref 1500–7800)
NEUTROPHILS NFR BLD AUTO: 73.6 %
PLATELET # BLD AUTO: 186 THOUSAND/UL (ref 140–400)
PMV BLD REES-ECKER: 8.5 FL (ref 7.5–12.5)
POTASSIUM SERPL-SCNC: 4.1 MMOL/L (ref 3.5–5.3)
PROT SERPL-MCNC: 6.8 G/DL (ref 6.1–8.1)
RBC # BLD AUTO: 4.31 MILLION/UL (ref 3.8–5.1)
SODIUM SERPL-SCNC: 137 MMOL/L (ref 135–146)
WBC # BLD AUTO: 5.7 THOUSAND/UL (ref 3.8–10.8)

## 2025-08-22 LAB
ATRIAL RATE: 94 BPM
P AXIS: 64 DEGREES
P OFFSET: 201 MS
P ONSET: 148 MS
PR INTERVAL: 148 MS
Q ONSET: 222 MS
QRS COUNT: 16 BEATS
QRS DURATION: 68 MS
QT INTERVAL: 332 MS
QTC CALCULATION(BAZETT): 415 MS
QTC FREDERICIA: 385 MS
R AXIS: 71 DEGREES
T AXIS: 82 DEGREES
T OFFSET: 388 MS
VENTRICULAR RATE: 94 BPM

## 2025-09-08 ENCOUNTER — APPOINTMENT (OUTPATIENT)
Dept: GASTROENTEROLOGY | Facility: CLINIC | Age: 50
End: 2025-09-08
Payer: COMMERCIAL

## 2025-09-18 ENCOUNTER — APPOINTMENT (OUTPATIENT)
Dept: OBSTETRICS AND GYNECOLOGY | Facility: CLINIC | Age: 50
End: 2025-09-18
Payer: COMMERCIAL

## 2025-09-24 ENCOUNTER — APPOINTMENT (OUTPATIENT)
Dept: PRIMARY CARE | Facility: CLINIC | Age: 50
End: 2025-09-24
Payer: COMMERCIAL

## 2025-10-28 ENCOUNTER — APPOINTMENT (OUTPATIENT)
Dept: RHEUMATOLOGY | Facility: CLINIC | Age: 50
End: 2025-10-28
Payer: COMMERCIAL

## 2025-11-24 ENCOUNTER — APPOINTMENT (OUTPATIENT)
Dept: OBSTETRICS AND GYNECOLOGY | Facility: CLINIC | Age: 50
End: 2025-11-24
Payer: COMMERCIAL